# Patient Record
Sex: FEMALE | Race: WHITE | URBAN - METROPOLITAN AREA
[De-identification: names, ages, dates, MRNs, and addresses within clinical notes are randomized per-mention and may not be internally consistent; named-entity substitution may affect disease eponyms.]

---

## 2018-03-01 ENCOUNTER — OFFICE VISIT (OUTPATIENT)
Dept: FAMILY MEDICINE | Facility: CLINIC | Age: 28
End: 2018-03-01
Payer: COMMERCIAL

## 2018-03-01 VITALS — DIASTOLIC BLOOD PRESSURE: 67 MMHG | TEMPERATURE: 97.7 F | SYSTOLIC BLOOD PRESSURE: 102 MMHG | HEART RATE: 70 BPM

## 2018-03-01 DIAGNOSIS — Z71.84 TRAVEL ADVICE ENCOUNTER: Primary | ICD-10-CM

## 2018-03-01 DIAGNOSIS — Z23 NEED FOR VACCINATION: ICD-10-CM

## 2018-03-01 PROCEDURE — 90717 YELLOW FEVER VACCINE SUBQ: CPT | Mod: GA | Performed by: NURSE PRACTITIONER

## 2018-03-01 PROCEDURE — 90472 IMMUNIZATION ADMIN EACH ADD: CPT | Mod: GA | Performed by: NURSE PRACTITIONER

## 2018-03-01 PROCEDURE — 90471 IMMUNIZATION ADMIN: CPT | Mod: GA | Performed by: NURSE PRACTITIONER

## 2018-03-01 PROCEDURE — 90691 TYPHOID VACCINE IM: CPT | Mod: GA | Performed by: NURSE PRACTITIONER

## 2018-03-01 PROCEDURE — 99403 PREV MED CNSL INDIV APPRX 45: CPT | Mod: 25 | Performed by: NURSE PRACTITIONER

## 2018-03-01 PROCEDURE — 90632 HEPA VACCINE ADULT IM: CPT | Mod: GA | Performed by: NURSE PRACTITIONER

## 2018-03-01 RX ORDER — AZITHROMYCIN 500 MG/1
500 TABLET, FILM COATED ORAL DAILY
Qty: 3 TABLET | Refills: 0 | Status: SHIPPED | OUTPATIENT
Start: 2018-03-01 | End: 2018-03-04

## 2018-03-01 RX ORDER — ATOVAQUONE AND PROGUANIL HYDROCHLORIDE 250; 100 MG/1; MG/1
1 TABLET, FILM COATED ORAL DAILY
Qty: 15 TABLET | Refills: 0 | Status: SHIPPED | OUTPATIENT
Start: 2018-03-01

## 2018-03-01 RX ORDER — DROSPIRENONE AND ETHINYL ESTRADIOL 0.02-3(28)
KIT ORAL
COMMUNITY
Start: 2017-12-28

## 2018-03-01 RX ORDER — ACETAZOLAMIDE 125 MG/1
TABLET ORAL
Qty: 20 TABLET | Refills: 0 | Status: SHIPPED | OUTPATIENT
Start: 2018-03-01

## 2018-03-01 NOTE — PATIENT INSTRUCTIONS
Today March 1, 2018 you received the    Hepatitis A Vaccine -     Yellow Fever (YF)    Typhoid - injectable. This vaccine is valid for two years.   .    These appointments can be made as a NURSE ONLY visit.    **It is very important for the vaccinations to be given on the scheduled day(s), this helps ensure you receive the full effectiveness of the vaccine.**    Please call [facility] with any questions [phone]    Thank you for visiting [facility]

## 2018-03-01 NOTE — MR AVS SNAPSHOT
"              After Visit Summary   3/1/2018    Brenda Grace    MRN: [id number]           Patient Information     Date Of Birth          1990        Visit Information        Provider Department      3/1/2018 2:30 PM Enma Farah APRN CNP [facility]        Today's Diagnoses     Travel advice encounter    -  1    Need for vaccination          Care Instructions    Today March 1, 2018 you received the    Hepatitis A Vaccine -     Yellow Fever (YF)    Typhoid - injectable. This vaccine is valid for two years.   .    These appointments can be made as a NURSE ONLY visit.    **It is very important for the vaccinations to be given on the scheduled day(s), this helps ensure you receive the full effectiveness of the vaccine.**    Please call [facility] with any questions [phone]    Thank you for visiting [facility]            Follow-ups after your visit        Who to contact     If you have questions or need follow up information about today's clinic visit or your schedule please contact [facility] directly at [phone].  Normal or non-critical lab and imaging results will be communicated to you by [employer], letter or phone within 4 business days after the clinic has received the results. If you do not hear from us within 7 days, please contact the clinic through [employer] or phone. If you have a critical or abnormal lab result, we will notify you by phone as soon as possible.  Submit refill requests through [employer] or call your pharmacy and they will forward the refill request to us. Please allow 3 business days for your refill to be completed.          Additional Information About Your Visit        MyChart Information     [employer] lets you send messages to your doctor, view your test results, renew your prescriptions, schedule appointments and more. To sign up, go to [email or web address] . Click on \"Log in\" on the left side of the " "screen, which will take you to the Welcome page. Then click on \"Sign up Now\" on the right side of the page.     You will be asked to enter the access code listed below, as well as some personal information. Please follow the directions to create your username and password.     Your access code is: [email or web address]  Expires: 2018  2:02 PM     Your access code will  in 90 days. If you need help or a new code, please call your [facility] clinic or [phone].        Care EveryWhere ID     This is your Care EveryWhere ID. This could be used by other organizations to access your [facility] medical records  [id number]        Your Vitals Were     Pulse Temperature Last Period Breastfeeding?          70 97.7  F (36.5  C) (Oral) 2018 (Approximate) No         Blood Pressure from Last 3 Encounters:   18 102/67   11 116/71    Weight from Last 3 Encounters:   No data found for Wt              We Performed the Following     C YELLOW FEVER IMMUNIZATION, LIVE, SQ (STAMARIL)     HEPA VACCINE ADULT IM     TYPHOID VACCINE, IM          Today's Medication Changes          These changes are accurate as of 3/1/18  3:27 PM.  If you have any questions, ask your nurse or doctor.               Start taking these medicines.        Dose/Directions    acetaZOLAMIDE 125 MG tablet   Commonly known as:  DIAMOX   Used for:  Travel advice encounter        Take one tab every 12 hours starting 24 hours prior to [place] and continue for 24 hours while at the highest altitude   Quantity:  20 tablet   Refills:  0       atovaquone-proguanil 250-100 MG per tablet   Commonly known as:  MALARONE   Used for:  Travel advice encounter        Dose:  1 tablet   Take 1 tablet by mouth daily Start 2 days before exposure to Malaria and continue daily till  7 days after exposure.   Quantity:  15 tablet   Refills:  0       azithromycin 500 MG tablet   Commonly known as:  ZITHROMAX   Used for:  Travel advice encounter        Dose:  500 mg   Take 1 " tablet (500 mg) by mouth daily for 3 doses Take 1 tablet a day for up to 3 days for severe diarrhea   Quantity:  3 tablet   Refills:  0            Where to get your medicines      Some of these will need a paper prescription and others can be bought over the counter.  Ask your nurse if you have questions.     Bring a paper prescription for each of these medications     acetaZOLAMIDE 125 MG tablet    atovaquone-proguanil 250-100 MG per tablet    azithromycin 500 MG tablet                Primary Care Provider Office Phone # Fax #    [facility] [phone] [address]  [facility] [address]        Equal Access to Services     [facility] RENA : Hadii anderson nam hadasho Soamadeo, waaxda luqadaha, qaybta kaalmada adevalerieyada, cleo ramsey . So [facility] [phone].    ATENCIÓN: Si habla español, tiene a johnson disposición servicios gratuitos de asistencia lingüística. [facility] [phone].    We comply with applicable federal civil rights laws and [place] laws. We do not discriminate on the basis of race, color, national origin, age, disability, sex, sexual orientation, or gender identity.            Thank you!     Thank you for choosing [facility]  for your care. Our goal is always to provide you with excellent care. Hearing back from our patients is one way we can continue to improve our services. Please take a few minutes to complete the written survey that you may receive in the mail after your visit with us. Thank you!             Your Updated Medication List - Protect others around you: Learn how to safely use, store and throw away your medicines at www.disposemymeds.org.          This list is accurate as of 3/1/18  3:27 PM.  Always use your most recent med list.                   Brand Name Dispense Instructions for use Diagnosis    acetaZOLAMIDE 125 MG tablet    DIAMOX    20 tablet    Take one tab every 12 hours starting 24 hours prior to  [place] and continue for 24 hours while at the highest altitude    Travel advice encounter       atovaquone-proguanil 250-100 MG per tablet    MALARONE    15 tablet    Take 1 tablet by mouth daily Start 2 days before exposure to Malaria and continue daily till  7 days after exposure.    Travel advice encounter       azithromycin 500 MG tablet    ZITHROMAX    3 tablet    Take 1 tablet (500 mg) by mouth daily for 3 doses Take 1 tablet a day for up to 3 days for severe diarrhea    Travel advice encounter       ibuprofen 800 MG tablet    ADVIL/MOTRIN    24 tablet    Take 1 tablet by mouth every 8 hours as needed for pain.        NUSRAT 3-0.02 MG per tablet   Generic drug:  drospirenone-ethinyl estradiol           NO ACTIVE MEDICATIONS

## 2018-03-01 NOTE — PROGRESS NOTES
Nurse Note      Itinerary:  [place]      Departure Date: 05/22/2018      Return Date: 05/31/2018      Length of Trip 10 days      Reason for Travel: Pleasure            Urban or rural: Both      Accommodations: Airbnb/[place]([employer])        IMMUNIZATION HISTORY  Have you received any immunizations within the past 4 weeks?  No  Have you ever fainted from having your blood drawn or from an injection?  No  Have you ever had a fever reaction to vaccination?  No  Have you ever had any bad reaction or side effect from any vaccination?  No  Have you ever had hepatitis A or B vaccine?  Yes  Do you live (or work closely) with anyone who has AIDS, an AIDS-like condition, any other immune disorder or who is on chemotherapy for cancer?  No  Do you have a family history of immunodeficiency?  No  Have you received any injection of immune globulin or any blood products during the past 12 months?  No    Patient roomed by PRAMOD Garcia  Brenda Grace is a 27 year old female seen today alone for counsultation for international travel to [place] for Tourism.  Patient will be departing in  2.5 month(s) and staying for   9 day(s) and  traveling with family member(s).  Including her grandfather who is on coumadin (will be seeing travel clinic closer to his home)    Patient itinerary :  will be in the [place]> [facility] 2 nights > [facility] 2 nights > [place] > [facility] > Home which presents risk for Dengue Fever, Schistosomiasis, Rabies, food borne illnesses, motor vehicle accidents and Typhoid. exposure.      Patient's activities will include sightseeing, jungle, hiking and high altitude exposure.    Patient's country of birth is [place]    Special medical concerns: none  Pre-travel questionnaire was completed by patient and reviewed by provider.     Vitals: /67  Pulse 70  Temp 97.7  F (36.5  C) (Oral)  LMP 02/17/2018 (Approximate)  Breastfeeding? No  BMI= There is no height or weight on file to calculate  BMI.    EXAM:  General:  Well-nourished, well-developed in no acute distress.  Appears to be stated age, interacts appropriately and expresses understanding of information given to patient.    Current Outpatient Prescriptions   Medication Sig Dispense Refill     NUSRAT 3-0.02 MG per tablet        atovaquone-proguanil (MALARONE) 250-100 MG per tablet Take 1 tablet by mouth daily Start 2 days before exposure to Malaria and continue daily till  7 days after exposure. 15 tablet 0     azithromycin (ZITHROMAX) 500 MG tablet Take 1 tablet (500 mg) by mouth daily for 3 doses Take 1 tablet a day for up to 3 days for severe diarrhea 3 tablet 0     acetaZOLAMIDE (DIAMOX) 125 MG tablet Take one tab every 12 hours starting 24 hours prior to [place] and continue for 24 hours while at the highest altitude 20 tablet 0     NO ACTIVE MEDICATIONS        ibuprofen (ADVIL,MOTRIN) 800 MG tablet Take 1 tablet by mouth every 8 hours as needed for pain. 24 tablet 0     There is no problem list on file for this patient.    No Known Allergies      Immunizations discussed include: (med student0  Hepatitis A:  Ordered/given today, risks, benefits and side effects reviewed  Hepatitis B: Up to date  Influenza: Up to date  Typhoid: Ordered/given today, risks, benefits and side effects reviewed  Rabies: Declined  Not concerned about risk of disease  reviewed managment of a animal bite or scratch (washing wound, seek medical care within 24 hours for post exposure prophylaxis )  Yellow Fever: Stamaril Ordered/given today - consent completed, side effects, precautions, allergies, risks discussed. Patient expressed understanding.  Yi Encephalitis: Not indicated  Meningococcus: Not indicated  Tetanus/Diphtheria: Up to date  Measles/Mumps/Rubella: Up to date  Cholera: Not needed  Polio: Up to date  Pneumococcal: Under age of 65  Varicella: Immune by disease history per patient report  Zostavax:  Not indicated  HPV:  Up to date  TB:  Low risk, tests  neg    Stamaril Informed Consent    The patient was provided with a copy of the IRB-approved consent form and all questions were answered before the patient agreed to participate by signing the informed consent document.   A copy of the form was provided to the patient.    Date: 3/1/18  Consent Version Date: 5/10/2018  Consent Obtained by:  Gagan  HIPAA:  Yes  HIPAA Authorization Signed Date: 3/1/2018      Inclusion/Exclusion Criteria:    (Similar to Yellow Fever-VAX)      The patient met all of the following inclusion criteria in order to be eligible for the Stamaril vaccination under this EAP (Expanded Access Investigational New Drug Program)           At increased risk for YF, including researchers, laboratory workers, vaccine production staff, and those who are traveling within 30 days to a YF-endemic region or to a country requiring proof of YF vaccination under IHRs (International Health Regulations)?       Yes     Patient is greater than or equal to 9 months of age on the day of vaccination?     Yes     Patient is greater than or equal to 18 years of age and signed and dated the Consent Forms?     Yes     Patient is < 18 years of age and parent(s)/guardian(s) signed and dated the Consent Forms?      Patient is 7 years to < 18 years of age and signed and dated the Assent form?        No Assent is required.  Patient is <7 years of age.     No      No      N/A     The patient did not meet any of the following criteria that would have excluded the patient from receiving the Stamaril vaccination under this EAP              Patient is less than 9 months of age.       No     The patient is breast-feeding and cannot stop nursing for at least 14 days after vaccination.    Note: Yellow Fever vaccine virus may be transmissible via breast milk by nursing mothers who are vaccinated during the final 2 weeks of pregnancy or post-partum.   Following transmission, infants may develop encephalitis.  The minimum time of  discontinuation of breastfeeding for 14 days after vaccination is based on the expected clearance of live-attenuated vaccine virus.       No     The patient is immunosuppressed, whether congenital or idiopathic, including for example, leukemia, lymphoma, other malignancies, and patients who are receiving immunosuppressant medications (e.g. Systemic corticosteroids [greater than the standard dose of topical or inhaled steroids], alkylating drugs, antimetabolites, of other cytotoxic or immunomodulatory drugs) or radiation therapy or organ transplantation.       No     The patient has known hypersensitivity to the active substance or to any of the excipients of Stamaril vaccine or to eggs or chicken proteins.     No     The patient is symptomatic for human immunodeficiency virus (HIV) infection     No     The patient is asymptomatic for HIV infection but accompanied by evidence of severe immune suppression    Note:  Evidence of severe immune suppression includes CD4+ T-cell counts < 200 cubic millimeters (or < 15% total lymphocytes in children aged < 6 years), or as determined by the health care provider.       No     The patient has a history of thymus dysfunction (including myasthenia gravis, thymoma, thymectomy)     No     Moderate or severe febrile illness or acute illness    Note: Participation in the EAP can be reassessed when moderate or severe febrile illness or acute illness has resolved.       No         Altitude Exposure on this trip: YES  Past tolerance to Altitude: no experience    ASSESSMENT/PLAN:  1. Need for vaccination    - C YELLOW FEVER IMMUNIZATION, LIVE, SQ (STAMARIL)  - HEPA VACCINE ADULT IM  - TYPHOID VACCINE, IM    2. Travel advice encounter  Advised patient to consider spending first night in [facility] instead of [facility] [place] in region   - C YELLOW FEVER IMMUNIZATION, LIVE, SQ (STAMARIL)  - HEPA VACCINE ADULT IM  - TYPHOID VACCINE, IM  - atovaquone-proguanil  (MALARONE) 250-100 MG per tablet; Take 1 tablet by mouth daily Start 2 days before exposure to Malaria and continue daily till  7 days after exposure.  Dispense: 15 tablet; Refill: 0  - azithromycin (ZITHROMAX) 500 MG tablet; Take 1 tablet (500 mg) by mouth daily for 3 doses Take 1 tablet a day for up to 3 days for severe diarrhea  Dispense: 3 tablet; Refill: 0  - acetaZOLAMIDE (DIAMOX) 125 MG tablet; Take one tab every 12 hours starting 24 hours prior to C[place] and continue for 24 hours while at the highest altitude  Dispense: 20 tablet; Refill: 0  I have reviewed general recommendations for safe travel   including: food/water precautions, insect precautions, safer sex   practices given high prevalence of Zika, HIV and other STDs,   roadway safety. Educational materials and [employer] report provided.    Malaraia prophylaxis recommended: Malarone  Symptomatic treatment for traveler's diarrhea: azithromycin  Altitude illness prevention and treatment:  Diamox prescription given with instructions on use and education provided on Acute altitude illness recognition and prevention.      Evacuation insurance advised and resources were provided to patient.    Total visit time 45 minutes  with over 50% of time spent counseling patient as detailed above.    Enma Farah CNP

## 2019-05-03 ENCOUNTER — HEALTH MAINTENANCE LETTER (OUTPATIENT)
Age: 29
End: 2019-05-03

## 2020-02-23 ENCOUNTER — HEALTH MAINTENANCE LETTER (OUTPATIENT)
Age: 30
End: 2020-02-23

## 2020-12-13 ENCOUNTER — HEALTH MAINTENANCE LETTER (OUTPATIENT)
Age: 30
End: 2020-12-13

## 2021-04-17 ENCOUNTER — HEALTH MAINTENANCE LETTER (OUTPATIENT)
Age: 31
End: 2021-04-17

## 2021-09-26 ENCOUNTER — HEALTH MAINTENANCE LETTER (OUTPATIENT)
Age: 31
End: 2021-09-26

## 2022-05-08 ENCOUNTER — HEALTH MAINTENANCE LETTER (OUTPATIENT)
Age: 32
End: 2022-05-08

## 2023-01-08 ENCOUNTER — HEALTH MAINTENANCE LETTER (OUTPATIENT)
Age: 33
End: 2023-01-08

## 2023-06-02 ENCOUNTER — HEALTH MAINTENANCE LETTER (OUTPATIENT)
Age: 33
End: 2023-06-02